# Patient Record
Sex: MALE | Race: WHITE
[De-identification: names, ages, dates, MRNs, and addresses within clinical notes are randomized per-mention and may not be internally consistent; named-entity substitution may affect disease eponyms.]

---

## 2018-05-30 ENCOUNTER — HOSPITAL ENCOUNTER (OUTPATIENT)
Dept: HOSPITAL 95 - ORSCSDS | Age: 68
Discharge: HOME | End: 2018-05-30
Attending: INTERNAL MEDICINE
Payer: COMMERCIAL

## 2018-05-30 VITALS — BODY MASS INDEX: 35.24 KG/M2 | HEIGHT: 70 IN | WEIGHT: 246.19 LBS

## 2018-05-30 DIAGNOSIS — Z79.82: ICD-10-CM

## 2018-05-30 DIAGNOSIS — E66.9: ICD-10-CM

## 2018-05-30 DIAGNOSIS — K57.30: ICD-10-CM

## 2018-05-30 DIAGNOSIS — K62.1: ICD-10-CM

## 2018-05-30 DIAGNOSIS — G80.9: ICD-10-CM

## 2018-05-30 DIAGNOSIS — Z87.891: ICD-10-CM

## 2018-05-30 DIAGNOSIS — Z79.899: ICD-10-CM

## 2018-05-30 DIAGNOSIS — G47.33: ICD-10-CM

## 2018-05-30 DIAGNOSIS — R15.9: Primary | ICD-10-CM

## 2018-05-30 DIAGNOSIS — Z86.010: ICD-10-CM

## 2018-05-30 PROCEDURE — 0DBP8ZX EXCISION OF RECTUM, VIA NATURAL OR ARTIFICIAL OPENING ENDOSCOPIC, DIAGNOSTIC: ICD-10-PCS | Performed by: INTERNAL MEDICINE

## 2022-10-15 ENCOUNTER — HOSPITAL ENCOUNTER (EMERGENCY)
Dept: HOSPITAL 95 - ER | Age: 72
Discharge: HOME | End: 2022-10-15
Payer: MEDICARE

## 2022-10-15 VITALS — WEIGHT: 259.99 LBS | HEIGHT: 70 IN | BODY MASS INDEX: 37.22 KG/M2

## 2022-10-15 DIAGNOSIS — Z88.5: ICD-10-CM

## 2022-10-15 DIAGNOSIS — Z79.899: ICD-10-CM

## 2022-10-15 DIAGNOSIS — J45.909: ICD-10-CM

## 2022-10-15 DIAGNOSIS — Z79.82: ICD-10-CM
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
## 2022-10-15 DIAGNOSIS — S09.90XA: Primary | ICD-10-CM

## 2022-10-15 DIAGNOSIS — N39.0: ICD-10-CM

## 2022-10-15 DIAGNOSIS — I10: ICD-10-CM

## 2022-10-15 LAB
ALBUMIN SERPL BCP-MCNC: 3.2 G/DL (ref 3.4–5)
ALBUMIN/GLOB SERPL: 0.9 {RATIO} (ref 0.8–1.8)
ALT SERPL W P-5'-P-CCNC: 29 U/L (ref 12–78)
ANION GAP SERPL CALCULATED.4IONS-SCNC: 8 MMOL/L (ref 6–16)
AST SERPL W P-5'-P-CCNC: 19 U/L (ref 12–37)
BASOPHILS # BLD AUTO: 0.06 K/MM3 (ref 0–0.23)
BASOPHILS NFR BLD AUTO: 1 % (ref 0–2)
BILIRUB SERPL-MCNC: 0.3 MG/DL (ref 0.1–1)
BUN SERPL-MCNC: 15 MG/DL (ref 8–24)
CALCIUM SERPL-MCNC: 9 MG/DL (ref 8.5–10.1)
CHLORIDE SERPL-SCNC: 107 MMOL/L (ref 98–108)
CO2 SERPL-SCNC: 25 MMOL/L (ref 21–32)
CREAT SERPL-MCNC: 0.92 MG/DL (ref 0.6–1.2)
DEPRECATED RDW RBC AUTO: 50.2 FL (ref 35.1–46.3)
EOSINOPHIL # BLD AUTO: 0.13 K/MM3 (ref 0–0.68)
EOSINOPHIL NFR BLD AUTO: 1 % (ref 0–6)
ERYTHROCYTE [DISTWIDTH] IN BLOOD BY AUTOMATED COUNT: 15.1 % (ref 11.7–14.2)
GLOBULIN SER CALC-MCNC: 3.5 G/DL (ref 2.2–4)
GLUCOSE SERPL-MCNC: 150 MG/DL (ref 70–99)
HCT VFR BLD AUTO: 40.3 % (ref 37–53)
HGB BLD-MCNC: 13.4 G/DL (ref 13.5–17.5)
IMM GRANULOCYTES # BLD AUTO: 0.08 K/MM3 (ref 0–0.1)
IMM GRANULOCYTES NFR BLD AUTO: 1 % (ref 0–1)
LEUKOCYTE ESTERASE UR QL STRIP: (no result)
LYMPHOCYTES # BLD AUTO: 1.8 K/MM3 (ref 0.84–5.2)
LYMPHOCYTES NFR BLD AUTO: 15 % (ref 21–46)
MCHC RBC AUTO-ENTMCNC: 33.3 G/DL (ref 31.5–36.5)
MCV RBC AUTO: 91 FL (ref 80–100)
MONOCYTES # BLD AUTO: 0.79 K/MM3 (ref 0.16–1.47)
MONOCYTES NFR BLD AUTO: 6 % (ref 4–13)
NEUTROPHILS # BLD AUTO: 9.42 K/MM3 (ref 1.96–9.15)
NEUTROPHILS NFR BLD AUTO: 77 % (ref 41–73)
NRBC # BLD AUTO: 0 K/MM3 (ref 0–0.02)
NRBC BLD AUTO-RTO: 0 /100 WBC (ref 0–0.2)
PLATELET # BLD AUTO: 213 K/MM3 (ref 150–400)
POTASSIUM SERPL-SCNC: 3.8 MMOL/L (ref 3.5–5.5)
PROT SERPL-MCNC: 6.7 G/DL (ref 6.4–8.2)
PROT UR STRIP-MCNC: (no result) MG/DL
RBC #/AREA URNS HPF: (no result) /HPF (ref 0–2)
SODIUM SERPL-SCNC: 140 MMOL/L (ref 136–145)
SP GR SPEC: 1.02 (ref 1–1.02)
UROBILINOGEN UR STRIP-MCNC: (no result) MG/DL

## 2022-12-01 ENCOUNTER — HOSPITAL ENCOUNTER (OUTPATIENT)
Dept: HOSPITAL 95 - ORSCSDS | Age: 72
Discharge: HOME | End: 2022-12-01
Attending: OPHTHALMOLOGY
Payer: MEDICARE

## 2022-12-01 VITALS — WEIGHT: 261.03 LBS | HEIGHT: 70 IN | BODY MASS INDEX: 37.37 KG/M2

## 2022-12-01 DIAGNOSIS — K21.9: ICD-10-CM

## 2022-12-01 DIAGNOSIS — I10: ICD-10-CM

## 2022-12-01 DIAGNOSIS — Z79.899: ICD-10-CM

## 2022-12-01 DIAGNOSIS — Z79.82: ICD-10-CM

## 2022-12-01 DIAGNOSIS — E66.9: ICD-10-CM

## 2022-12-01 DIAGNOSIS — G47.33: ICD-10-CM

## 2022-12-01 DIAGNOSIS — H25.12: Primary | ICD-10-CM

## 2022-12-01 DIAGNOSIS — J45.909: ICD-10-CM

## 2022-12-01 PROCEDURE — 08DK3ZZ EXTRACTION OF LEFT LENS, PERCUTANEOUS APPROACH: ICD-10-PCS | Performed by: OPHTHALMOLOGY

## 2022-12-01 PROCEDURE — V2632 POST CHMBR INTRAOCULAR LENS: HCPCS

## 2022-12-13 ENCOUNTER — HOSPITAL ENCOUNTER (OUTPATIENT)
Dept: HOSPITAL 95 - ORSCSDS | Age: 72
Discharge: HOME | End: 2022-12-13
Attending: OPHTHALMOLOGY
Payer: MEDICARE

## 2022-12-13 VITALS — WEIGHT: 262.35 LBS | HEIGHT: 70 IN | BODY MASS INDEX: 37.56 KG/M2

## 2022-12-13 DIAGNOSIS — J45.909: ICD-10-CM

## 2022-12-13 DIAGNOSIS — Z96.1: ICD-10-CM

## 2022-12-13 DIAGNOSIS — Z79.899: ICD-10-CM

## 2022-12-13 DIAGNOSIS — Z79.82: ICD-10-CM

## 2022-12-13 DIAGNOSIS — H25.11: Primary | ICD-10-CM

## 2022-12-13 DIAGNOSIS — I10: ICD-10-CM

## 2022-12-13 DIAGNOSIS — G47.33: ICD-10-CM

## 2022-12-13 DIAGNOSIS — E66.9: ICD-10-CM

## 2022-12-13 PROCEDURE — 08RJ3JZ REPLACEMENT OF RIGHT LENS WITH SYNTHETIC SUBSTITUTE, PERCUTANEOUS APPROACH: ICD-10-PCS | Performed by: OPHTHALMOLOGY

## 2022-12-13 PROCEDURE — V2632 POST CHMBR INTRAOCULAR LENS: HCPCS

## 2022-12-13 NOTE — NUR
12/13/22 1425 Joe Ortiz
CALL RACHEL WITHIN REACH. TETRACAINE IN AT 1421 IN THE RIGHT EYE AND
PLEDGETT AT 1423

## 2023-08-10 ENCOUNTER — HOSPITAL ENCOUNTER (OUTPATIENT)
Dept: HOSPITAL 95 - LAB SHORT | Age: 73
End: 2023-08-10
Attending: PHYSICIAN ASSISTANT
Payer: MEDICARE

## 2023-08-10 DIAGNOSIS — R31.9: Primary | ICD-10-CM
